# Patient Record
Sex: FEMALE | Race: WHITE | NOT HISPANIC OR LATINO
[De-identification: names, ages, dates, MRNs, and addresses within clinical notes are randomized per-mention and may not be internally consistent; named-entity substitution may affect disease eponyms.]

---

## 2024-01-26 ENCOUNTER — NON-APPOINTMENT (OUTPATIENT)
Age: 24
End: 2024-01-26

## 2024-01-30 ENCOUNTER — APPOINTMENT (OUTPATIENT)
Dept: OTOLARYNGOLOGY | Facility: CLINIC | Age: 24
End: 2024-01-30
Payer: COMMERCIAL

## 2024-01-30 ENCOUNTER — NON-APPOINTMENT (OUTPATIENT)
Age: 24
End: 2024-01-30

## 2024-01-30 VITALS
OXYGEN SATURATION: 99 % | WEIGHT: 130 LBS | BODY MASS INDEX: 23.04 KG/M2 | TEMPERATURE: 98 F | HEIGHT: 63 IN | HEART RATE: 77 BPM | SYSTOLIC BLOOD PRESSURE: 113 MMHG | DIASTOLIC BLOOD PRESSURE: 67 MMHG

## 2024-01-30 PROBLEM — Z00.00 ENCOUNTER FOR PREVENTIVE HEALTH EXAMINATION: Status: ACTIVE | Noted: 2024-01-30

## 2024-01-30 PROCEDURE — 99203 OFFICE O/P NEW LOW 30 MIN: CPT | Mod: 25

## 2024-01-30 PROCEDURE — 31231 NASAL ENDOSCOPY DX: CPT

## 2024-01-30 NOTE — ASSESSMENT
[FreeTextEntry1] : 23F presenting with bilateral intermittent tonsillar swelling, finished a course of abx last week and her symptoms improved but still feels like her tonsils are swollen. On exam, has 3+ tonsils L > R, with inflammation. B/l level 3 LAD. Flexible nasopharyngoscopy revealing adenoid inflammation with purulence c/w adenitis.  Plan: - Recommend nasal rinses for adenitis - Will recommend OR for tonsillotomy with CO2 laser, adenoidectomy, laser control of epistaxis

## 2024-01-30 NOTE — HISTORY OF PRESENT ILLNESS
[de-identified] : 23F presenting with bilateral intermittent tonsillar swelling. She had fevers last week, no longer febrile; finished course of abx on Sunday and felt like her symptoms had improved. Still feels like her tonsils are somewhat swollen.  Also has a history of recurrent epistaxis, L sided. Occurs intermittently for a few weeks at a time, will improve and then worsen.

## 2024-01-30 NOTE — PHYSICAL EXAM
[] : septum deviated bilaterally [de-identified] : Severe recurrent tonsillitis adenoids w infection chromic

## 2024-01-31 ENCOUNTER — TRANSCRIPTION ENCOUNTER (OUTPATIENT)
Age: 24
End: 2024-01-31

## 2024-02-20 RX ORDER — LIDOCAINE HYDROCHLORIDE 20 MG/ML
2 SOLUTION ORAL; TOPICAL
Qty: 2 | Refills: 3 | Status: ACTIVE | COMMUNITY
Start: 2024-02-20 | End: 1900-01-01

## 2024-02-20 RX ORDER — IBUPROFEN 800 MG/1
800 TABLET, FILM COATED ORAL 3 TIMES DAILY
Qty: 30 | Refills: 0 | Status: ACTIVE | COMMUNITY
Start: 2024-02-20 | End: 1900-01-01

## 2024-02-20 RX ORDER — AZITHROMYCIN 500 MG/1
500 TABLET, FILM COATED ORAL DAILY
Qty: 2 | Refills: 0 | Status: ACTIVE | COMMUNITY
Start: 2024-02-20 | End: 1900-01-01

## 2024-02-20 RX ORDER — METHYLPREDNISOLONE 4 MG/1
4 TABLET ORAL
Qty: 1 | Refills: 0 | Status: ACTIVE | COMMUNITY
Start: 2024-02-20 | End: 1900-01-01

## 2024-02-21 ENCOUNTER — TRANSCRIPTION ENCOUNTER (OUTPATIENT)
Age: 24
End: 2024-02-21

## 2024-02-21 NOTE — ASU DISCHARGE PLAN (ADULT/PEDIATRIC) - ASU DC SPECIAL INSTRUCTIONSFT
Full liquid diet on day 1, then may have soft diet on day 2.  ** soft food diet, puree's, protein shakes, lots of liquids - only room temperature food or liquids. Nothing too hot or cold to eat. no spicy food.  -saline rinses both sides of nose with NeilMed Sinus Rinse ( can purchase at any pharmacy ) - start saline rinses on Thursday in 24 hours.   - no nose blowing   -follow up in 5-7 days - call for appt   -Take Rx as directed and indicated by Dr. Cifuentes - viscous xylocaine, amoxicillin, ibuprofen  - tylenol # 3 as needed for moderate - severe pain every 4-6 hours   - Colace - take while taking tylenol # 3 to prevent constipation Full liquid diet on day 1, then may have soft diet on day 2.  ** soft food diet, puree's, protein shakes, lots of liquids - only room temperature food or liquids. Nothing too hot or cold to eat. no spicy food.  -saline rinses both sides of nose with NeilMed Sinus Rinse ( can purchase at any pharmacy ) - start saline rinses on Thursday in 24 hours.   - no nose blowing   -follow up in 5-7 days - call for appt   -Take Rx as directed and indicated by Dr. Cifuentes - viscous xylocaine, amoxicillin, ibuprofen, pain medication

## 2024-02-21 NOTE — ASU DISCHARGE PLAN (ADULT/PEDIATRIC) - CARE PROVIDER_API CALL
Dudley Cifuentes)  Otolaryngology  [address], [facility] [address]  [place], [place] [address]  Phone: [phone]  Fax: [phone]  Follow Up Time:

## 2024-02-21 NOTE — ASU PATIENT PROFILE, ADULT - ARRIVAL TIME
What Type Of Note Output Would You Prefer (Optional)?: Standard Output How Severe Are Your Spot(S)?: moderate Have Your Spot(S) Been Treated In The Past?: has not been treated Hpi Title: Evaluation of Skin Lesions 06:00

## 2024-02-22 ENCOUNTER — OUTPATIENT (OUTPATIENT)
Dept: OUTPATIENT SERVICES | Facility: HOSPITAL | Age: 24
LOS: 1 days | Discharge: HOME | End: 2024-02-22
Payer: COMMERCIAL

## 2024-02-22 ENCOUNTER — RESULT REVIEW (OUTPATIENT)
Age: 24
End: 2024-02-22

## 2024-02-22 ENCOUNTER — APPOINTMENT (OUTPATIENT)
Dept: OTOLARYNGOLOGY | Facility: AMBULATORY SURGERY CENTER | Age: 24
End: 2024-02-22

## 2024-02-22 VITALS
HEIGHT: 64 IN | WEIGHT: 135.14 LBS | SYSTOLIC BLOOD PRESSURE: 106 MMHG | DIASTOLIC BLOOD PRESSURE: 58 MMHG | HEART RATE: 68 BPM | OXYGEN SATURATION: 100 % | RESPIRATION RATE: 16 BRPM | TEMPERATURE: 98 F

## 2024-02-22 VITALS
OXYGEN SATURATION: 100 % | DIASTOLIC BLOOD PRESSURE: 72 MMHG | RESPIRATION RATE: 20 BRPM | HEART RATE: 64 BPM | SYSTOLIC BLOOD PRESSURE: 112 MMHG

## 2024-02-22 DIAGNOSIS — K08.409 PARTIAL LOSS OF TEETH, UNSPECIFIED CAUSE, UNSPECIFIED CLASS: Chronic | ICD-10-CM

## 2024-02-22 PROCEDURE — 42821 REMOVE TONSILS AND ADENOIDS: CPT

## 2024-02-22 PROCEDURE — 88304 TISSUE EXAM BY PATHOLOGIST: CPT | Mod: 26

## 2024-02-22 PROCEDURE — 30903 CONTROL OF NOSEBLEED: CPT

## 2024-02-22 DEVICE — LASER EZTIP E4-100MM: Type: IMPLANTABLE DEVICE | Status: FUNCTIONAL

## 2024-02-22 RX ORDER — FEXOFENADINE HCL 30 MG
1 TABLET ORAL
Refills: 0 | DISCHARGE

## 2024-02-22 RX ORDER — MORPHINE SULFATE 50 MG/1
2 CAPSULE, EXTENDED RELEASE ORAL
Refills: 0 | Status: DISCONTINUED | OUTPATIENT
Start: 2024-02-22 | End: 2024-02-22

## 2024-02-22 RX ORDER — SODIUM CHLORIDE 9 MG/ML
500 INJECTION, SOLUTION INTRAVENOUS
Refills: 0 | Status: DISCONTINUED | OUTPATIENT
Start: 2024-02-22 | End: 2024-02-22

## 2024-02-22 RX ORDER — ONDANSETRON 8 MG/1
4 TABLET, FILM COATED ORAL ONCE
Refills: 0 | Status: DISCONTINUED | OUTPATIENT
Start: 2024-02-22 | End: 2024-02-22

## 2024-02-22 NOTE — BRIEF OPERATIVE NOTE - NSICDXBRIEFPREOP_GEN_ALL_CORE_FT
PRE-OP DIAGNOSIS:  Epistaxis 22-Feb-2024 08:01:37  Mary Acuña  Chronic hypertrophy of tonsils and adenoids 22-Feb-2024 08:01:28  Mary Acuña  Chronic tonsillitis 22-Feb-2024 08:01:14  Mary Acuña

## 2024-02-22 NOTE — PRE-ANESTHESIA EVALUATION ADULT - NSANTHSNORERD_ENT_A_CORE
Medication Sig Start Date End Date Taking? Authorizing Provider   oxyCODONE-acetaminophen (PERCOCET) 5-325 MG per tablet Take 1 tablet by mouth every 6 hours as needed for Pain for up to 15 days. Intended supply: 5 days. Take lowest dose possible to manage pain Max Daily Amount: 4 tablets 4/12/23 4/27/23  Jarrod Centeno MD   neomycin-bacitracin-polymyxin (NEOSPORIN) 400-5-5000 ointment Apply topically 2 times daily. Apply to right face, right shoulder 4/10/23   Jamaal Torres MD   metoprolol tartrate (LOPRESSOR) 25 MG tablet Take 1 tablet by mouth 2 times daily 4/10/23   Jamaal Torres MD   polyethylene glycol (GLYCOLAX) 17 g packet Take 17 g by mouth daily as needed for Constipation 4/10/23 5/10/23  Jamaal Torres MD   melatonin 5 MG TBDP disintegrating tablet Take 1 tablet by mouth nightly 4/10/23   Jamaal Torres MD   aspirin 325 MG EC tablet Take 1 tablet by mouth 2 times daily 4/7/23 5/7/23  [employer], DO   levothyroxine (SYNTHROID) 25 MCG tablet Take 1 tablet by mouth Daily    Historical Provider, MD   palbociclib Lorilee Formosa) 75 MG capsule Take 75 mg by mouth See Admin Instructions Given for 21 days then off for 7 days    Historical Provider, MD   ergocalciferol (ERGOCALCIFEROL) 1.25 MG (17573 UT) capsule Take 1 capsule by mouth once a week Given Sunday    Historical Provider, MD   letrozole [facility]) 2.5 MG tablet Take 1 tablet by mouth daily 12/21/22   Jeremy Mendes MD   calcium carbonate 600 MG TABS tablet Take 1 tablet by mouth daily    Historical Provider, MD   Multiple Vitamins-Minerals (THERAPEUTIC MULTIVITAMIN-MINERALS) tablet Take 1 tablet by mouth daily Mail order vitamin made by her cardiologist.    Historical Provider, MD       Allergies:    Patient has no known allergies. Social History:    reports that she has never smoked. She has never used smokeless tobacco. She reports current alcohol use. She reports that she does not use drugs.     Family History:
No

## 2024-02-22 NOTE — BRIEF OPERATIVE NOTE - NSICDXBRIEFPROCEDURE_GEN_ALL_CORE_FT
PROCEDURES:  Tonsillectomy and adenoidectomy in adult patient 22-Feb-2024 07:59:24  Mary Acuña control of hemorrhage, nasal cavity, anterior 22-Feb-2024 08:00:55  Mary Acuña

## 2024-02-22 NOTE — BRIEF OPERATIVE NOTE - NSICDXBRIEFPOSTOP_GEN_ALL_CORE_FT
POST-OP DIAGNOSIS:  Chronic tonsillitis 22-Feb-2024 08:02:01  Mary Acuña  Chronic hypertrophy of tonsils and adenoids 22-Feb-2024 08:01:51  Mary Acuña  Epistaxis 22-Feb-2024 08:01:43  Mary Acuña

## 2024-02-27 PROBLEM — Z78.9 OTHER SPECIFIED HEALTH STATUS: Chronic | Status: ACTIVE | Noted: 2024-02-21

## 2024-02-27 LAB — SURGICAL PATHOLOGY STUDY: SIGNIFICANT CHANGE UP

## 2024-02-29 ENCOUNTER — APPOINTMENT (OUTPATIENT)
Dept: OTOLARYNGOLOGY | Facility: CLINIC | Age: 24
End: 2024-02-29
Payer: COMMERCIAL

## 2024-02-29 VITALS
SYSTOLIC BLOOD PRESSURE: 109 MMHG | DIASTOLIC BLOOD PRESSURE: 73 MMHG | HEART RATE: 74 BPM | BODY MASS INDEX: 23.04 KG/M2 | TEMPERATURE: 97.9 F | WEIGHT: 130 LBS | OXYGEN SATURATION: 99 % | HEIGHT: 63 IN

## 2024-02-29 PROCEDURE — 99024 POSTOP FOLLOW-UP VISIT: CPT

## 2024-02-29 NOTE — HISTORY OF PRESENT ILLNESS
[de-identified] : 23-year-old female 1 week S/P tonsillotomy, adenoidectomy, and laser control of epistaxis. Healing ok. No abnormal findings noted.

## 2024-12-09 ENCOUNTER — NON-APPOINTMENT (OUTPATIENT)
Age: 24
End: 2024-12-09

## (undated) DEVICE — ELCTR BOVIE TIP BLADE INSULATED 2.75" EDGE

## (undated) DEVICE — GLV 7.5 PROTEXIS (WHITE)

## (undated) DEVICE — WARMING BLANKET LOWER ADULT

## (undated) DEVICE — Device

## (undated) DEVICE — SYR ASEPTO

## (undated) DEVICE — TUBING RAPIDVAC SMOKE EVACUATOR .25" X 10FT

## (undated) DEVICE — VENODYNE/SCD SLEEVE CALF MEDIUM

## (undated) DEVICE — GOWN ROYAL SILK XL

## (undated) DEVICE — COTTONBALL LG

## (undated) DEVICE — SUT CHROMIC 2-0 27" CT-2

## (undated) DEVICE — DRAPE TOWEL BLUE 17" X 24"

## (undated) DEVICE — PETRI DISH MED 3.5"

## (undated) DEVICE — ELCTR BOVIE PENCIL HANDPIECE ROCKER SWITCH 15FT

## (undated) DEVICE — GLV 6.5 PROTEXIS (WHITE)

## (undated) DEVICE — GLV 6.5 PROTEXIS W HYDROGEL

## (undated) DEVICE — PACK TONSIL ADENOID

## (undated) DEVICE — ELCTR BOVIE PENCIL BLADE 10FT

## (undated) DEVICE — ELCTR BOVIE SUCTION 8FR 6"

## (undated) DEVICE — DRSG TELFA 3 X 8

## (undated) DEVICE — SLV COMPRESSION KNEE MED

## (undated) DEVICE — SOL ANTI FOG

## (undated) DEVICE — SUT CHROMIC 2-0 27" SH